# Patient Record
Sex: MALE | Race: WHITE | NOT HISPANIC OR LATINO | Employment: FULL TIME | URBAN - METROPOLITAN AREA
[De-identification: names, ages, dates, MRNs, and addresses within clinical notes are randomized per-mention and may not be internally consistent; named-entity substitution may affect disease eponyms.]

---

## 2018-03-01 ENCOUNTER — LAB (OUTPATIENT)
Dept: LAB | Facility: HOSPITAL | Age: 23
End: 2018-03-01

## 2018-03-01 ENCOUNTER — OFFICE VISIT (OUTPATIENT)
Dept: FAMILY MEDICINE CLINIC | Facility: CLINIC | Age: 23
End: 2018-03-01

## 2018-03-01 VITALS
WEIGHT: 204 LBS | DIASTOLIC BLOOD PRESSURE: 70 MMHG | SYSTOLIC BLOOD PRESSURE: 112 MMHG | HEIGHT: 72 IN | BODY MASS INDEX: 27.63 KG/M2 | HEART RATE: 66 BPM

## 2018-03-01 DIAGNOSIS — Z00.00 ANNUAL PHYSICAL EXAM: ICD-10-CM

## 2018-03-01 DIAGNOSIS — Z00.00 ANNUAL PHYSICAL EXAM: Primary | ICD-10-CM

## 2018-03-01 LAB
ANION GAP SERPL CALCULATED.3IONS-SCNC: 6 MMOL/L (ref 3–11)
ARTICHOKE IGE QN: 76 MG/DL (ref 0–130)
BUN BLD-MCNC: 11 MG/DL (ref 9–23)
BUN/CREAT SERPL: 9.2 (ref 7–25)
CALCIUM SPEC-SCNC: 9.5 MG/DL (ref 8.7–10.4)
CHLORIDE SERPL-SCNC: 103 MMOL/L (ref 99–109)
CHOLEST SERPL-MCNC: 150 MG/DL (ref 0–200)
CO2 SERPL-SCNC: 30 MMOL/L (ref 20–31)
CREAT BLD-MCNC: 1.2 MG/DL (ref 0.6–1.3)
GFR SERPL CREATININE-BSD FRML MDRD: 74 ML/MIN/1.73
GLUCOSE BLD-MCNC: 86 MG/DL (ref 70–100)
HDLC SERPL-MCNC: 56 MG/DL (ref 40–60)
POTASSIUM BLD-SCNC: 4.1 MMOL/L (ref 3.5–5.5)
SODIUM BLD-SCNC: 139 MMOL/L (ref 132–146)
TRIGL SERPL-MCNC: 53 MG/DL (ref 0–150)

## 2018-03-01 PROCEDURE — 36415 COLL VENOUS BLD VENIPUNCTURE: CPT

## 2018-03-01 PROCEDURE — 80061 LIPID PANEL: CPT

## 2018-03-01 PROCEDURE — 80048 BASIC METABOLIC PNL TOTAL CA: CPT

## 2018-03-01 PROCEDURE — 90715 TDAP VACCINE 7 YRS/> IM: CPT | Performed by: INTERNAL MEDICINE

## 2018-03-01 PROCEDURE — 90471 IMMUNIZATION ADMIN: CPT | Performed by: INTERNAL MEDICINE

## 2018-03-01 PROCEDURE — 90686 IIV4 VACC NO PRSV 0.5 ML IM: CPT | Performed by: INTERNAL MEDICINE

## 2018-03-01 PROCEDURE — 99385 PREV VISIT NEW AGE 18-39: CPT | Performed by: INTERNAL MEDICINE

## 2018-03-01 PROCEDURE — 90472 IMMUNIZATION ADMIN EACH ADD: CPT | Performed by: INTERNAL MEDICINE

## 2018-03-01 NOTE — PATIENT INSTRUCTIONS
Preventive Care 18-39 Years, Male  Preventive care refers to lifestyle choices and visits with your health care provider that can promote health and wellness.  What does preventive care include?  · A yearly physical exam. This is also called an annual well check.  · Dental exams once or twice a year.  · Routine eye exams. Ask your health care provider how often you should have your eyes checked.  · Personal lifestyle choices, including:  ¨ Daily care of your teeth and gums.  ¨ Regular physical activity.  ¨ Eating a healthy diet.  ¨ Avoiding tobacco and drug use.  ¨ Limiting alcohol use.  ¨ Practicing safe sex.  What happens during an annual well check?  The services and screenings done by your health care provider during your annual well check will depend on your age, overall health, lifestyle risk factors, and family history of disease.  Counseling   Your health care provider may ask you questions about your:  · Alcohol use.  · Tobacco use.  · Drug use.  · Emotional well-being.  · Home and relationship well-being.  · Sexual activity.  · Eating habits.  · Work and work environment.  Screening   You may have the following tests or measurements:  · Height, weight, and BMI.  · Blood pressure.  · Lipid and cholesterol levels. These may be checked every 5 years starting at age 20.  · Diabetes screening. This is done by checking your blood sugar (glucose) after you have not eaten for a while (fasting).  · Skin check.  · Hepatitis C blood test.  · Hepatitis B blood test.  · Sexually transmitted disease (STD) testing.  Discuss your test results, treatment options, and if necessary, the need for more tests with your health care provider.  Vaccines   Your health care provider may recommend certain vaccines, such as:  · Influenza vaccine. This is recommended every year.  · Tetanus, diphtheria, and acellular pertussis (Tdap, Td) vaccine. You may need a Td booster every 10 years.  · Varicella vaccine. You may need this if you  have not been vaccinated.  · HPV vaccine. If you are 26 or younger, you may need three doses over 6 months.  · Measles, mumps, and rubella (MMR) vaccine. You may need at least one dose of MMR. You may also need a second dose.  · Pneumococcal 13-valent conjugate (PCV13) vaccine. You may need this if you have certain conditions and have not been vaccinated.  · Pneumococcal polysaccharide (PPSV23) vaccine. You may need one or two doses if you smoke cigarettes or if you have certain conditions.  · Meningococcal vaccine. One dose is recommended if you are age 19-21 years and a first-year college student living in a residence [place], or if you have one of several medical conditions. You may also need additional booster doses.  · Hepatitis A vaccine. You may need this if you have certain conditions or if you travel or work in places where you may be exposed to hepatitis A.  · Hepatitis B vaccine. You may need this if you have certain conditions or if you travel or work in places where you may be exposed to hepatitis B.  · Haemophilus influenzae type b (Hib) vaccine. You may need this if you have certain risk factors.  Talk to your health care provider about which screenings and vaccines you need and how often you need them.  This information is not intended to replace advice given to you by your health care provider. Make sure you discuss any questions you have with your health care provider.  Document Released: 02/13/2003 Document Revised: 09/06/2017 Document Reviewed: 10/18/2016  [employer] Patient Education © 2017 [employer]

## 2018-03-01 NOTE — PROGRESS NOTES
Reason for Visit   This is a 23 yr old patient who presents for a complete physical exam.    Chief Complaint   Patient offers no new complaints.  New to me a pcp.  Has not been to pcp in a few years.    History of Present Illness   Here for CPE.    ý   Review of Systems   General: no fever, chills, weight loss, or fatigue  Eyes: no blurry vision or change in vision  ENT: no change in hearing, difficulty hearing, rhinorrhea or nasal discharge  CV: no cp, sob, palpitations; no PND, orthopnea, le edema; no urrutia   Respiratory: no cough, wheezes, sob  GI: no change in bowel habits, no n/v/d/c, no melena, no blood in stool   : no frequency, no urgency, no dysuria  MS: no joint pains, back pain, or myalgias  Neuro: no headache, dizziness, or weakness; no new parenthesis.  Endocrine: no polyuria, polydipsia or polyphagia; no heat or cold intolerance  Heme: no easy bruising or bleeding   Skin: no rashes or abnormal moles     Remainder of ROS reviewed in detail and found to be negative and noncontributory.         There is no problem list on file for this patient.      Past Surgical History:   Procedure Laterality Date   • KNEE ARTHROSCOPY W/ MENISCAL REPAIR Right        Current Outpatient Prescriptions   Medication Sig Dispense Refill   • Creatine powder Take  by mouth Daily.       No current facility-administered medications for this visit.        No Known Allergies    Family History   Family History   Problem Relation Age of Onset   • No Known Problems Sister    • No Known Problems Brother    • Dementia Paternal Grandfather    • Colon cancer Paternal Grandfather    • No Known Problems Sister        Social History   Social History     Social History   • Marital status: Single     Occupational History   •       Social History Main Topics   • Smoking status: Never Smoker   • Smokeless tobacco: Never Used   • Alcohol use 3.6 oz/week     6 Cans of beer per week   • Drug use: No   • Sexual  "activity: Yes     Birth control/ protection: Pill     Social History Narrative    3/18:    Working FT - pharm rep    Living with 3 roommates.    [employer] grad - [employer]    Current sig other- 1yr, going well.    +SA - girlfriend on ocp    Exercise - 6/7 days/wk    Dental: utd    Eye: no corrective lenses              There is no immunization history on file for this patient.    Health Maintenance  Health Maintenance   Topic Date Due   • TDAP/TD VACCINES (1 - Tdap) 02/08/2012   • INFLUENZA VACCINE  08/01/2017       Blood pressure 112/70, pulse 66, height 182.9 cm (72\"), weight 92.5 kg (204 lb).  Body mass index is 27.67 kg/(m^2).  Last 3 weights    03/01/18  0816   Weight: 92.5 kg (204 lb)         Physical Exam   Gen: Pleasant, NAD  HEENT: perrla, eomi, conj without pallor, no icterus/TMs with nl LR/ nares without erythema/mmm, op clear  Neck: supple, no lad or thyromegaly/nodules, no bruit , no jvd  Pulm: cta b/l, no w/r/r  CV: S1, S2 reg, no mrg, no s3/s4  Abd: soft, nt, nd, + bs, no hsm  : Normal male, no masses  Ext: no c/c/e, distal pulses intact  Neuro: AAO x 3, no focal motor or sensory deficits, normal gait   Skin: no rashes, no suspicious nevi     Assessment and Plan  23M for CPE  Discussion and plan:   General health screening appropriate fo rage reviewed  General nutrition recommendations reviewed  Exercise recommendations reviewed  Healthy weight guidelines reviewed  Testicular self exam reviewed, monthly self exam recommended  Annual physical exam recommended.  Imm : flu and tdap given  Screening labs ordered      "

## 2019-03-08 ENCOUNTER — OFFICE VISIT (OUTPATIENT)
Dept: FAMILY MEDICINE CLINIC | Facility: CLINIC | Age: 24
End: 2019-03-08

## 2019-03-08 VITALS
HEIGHT: 72 IN | HEART RATE: 73 BPM | TEMPERATURE: 99.3 F | WEIGHT: 208 LBS | OXYGEN SATURATION: 99 % | BODY MASS INDEX: 28.17 KG/M2

## 2019-03-08 DIAGNOSIS — J06.9 ACUTE URI: Primary | ICD-10-CM

## 2019-03-08 PROCEDURE — 99213 OFFICE O/P EST LOW 20 MIN: CPT | Performed by: NURSE PRACTITIONER

## 2019-03-08 RX ORDER — BROMPHENIRAMINE MALEATE, PSEUDOEPHEDRINE HYDROCHLORIDE, AND DEXTROMETHORPHAN HYDROBROMIDE 2; 30; 10 MG/5ML; MG/5ML; MG/5ML
5 SYRUP ORAL 4 TIMES DAILY PRN
Qty: 120 ML | Refills: 1 | Status: SHIPPED | OUTPATIENT
Start: 2019-03-08

## 2019-03-08 RX ORDER — AMOXICILLIN AND CLAVULANATE POTASSIUM 875; 125 MG/1; MG/1
1 TABLET, FILM COATED ORAL 2 TIMES DAILY
Qty: 14 TABLET | Refills: 0 | Status: SHIPPED | OUTPATIENT
Start: 2019-03-08

## 2019-03-08 NOTE — PROGRESS NOTES
Chief Complaint   Patient presents with   • URI     patient states he is having a head cold since tuesday morning. Has been taking mucines OTC to help and it has helped some but still lingering symptoms       Subjective   Festus High is a 24 y.o. male    History of Present Illness  Patient in with today with upper respiratory infection.  Ongoing since about Tuesday morning.  Patient has had fevers, sinus congestion, sore throat and ear pressure.  He denies body aches.  Patient has been taken Mucinex, ibuprofen.  Nighttime Mucinex seems to help quite a bit as well as the ibuprofen.  Daytime Mucinex does not seem to help this much.  He has had multiple family members as well as a girlfriend with similar symptoms.    No Known Allergies  History reviewed. No pertinent past medical history.   Past Surgical History:   Procedure Laterality Date   • KNEE ARTHROSCOPY W/ MENISCAL REPAIR Right      Social History     Socioeconomic History   • Marital status: Single     Spouse name: Not on file   • Number of children: Not on file   • Years of education: Not on file   • Highest education level: Not on file   Social Needs   • Financial resource strain: Not on file   • Food insecurity - worry: Not on file   • Food insecurity - inability: Not on file   • Transportation needs - medical: Not on file   • Transportation needs - non-medical: Not on file   Occupational History   • Occupation:    Tobacco Use   • Smoking status: Never Smoker   • Smokeless tobacco: Never Used   Substance and Sexual Activity   • Alcohol use: Yes     Alcohol/week: 3.6 oz     Types: 6 Cans of beer per week   • Drug use: No   • Sexual activity: Yes     Birth control/protection: Pill   Other Topics Concern   • Not on file   Social History Narrative    3/18:    Working FT - pharm rep    Living with 3 roommates.    [employer] grad - [employer]    Current sig other- 1yr, going well.    +SA - girlfriend on ocp    Exercise -  6/7 days/wk    Dental: utd    Eye: no corrective lenses        Current Outpatient Medications:   •  amoxicillin-clavulanate (AUGMENTIN) 875-125 MG per tablet, Take 1 tablet by mouth 2 (Two) Times a Day., Disp: 14 tablet, Rfl: 0  •  brompheniramine-pseudoephedrine-DM 30-2-10 MG/5ML syrup, Take 5 mL by mouth 4 (Four) Times a Day As Needed for Cough., Disp: 120 mL, Rfl: 1  •  Creatine powder, Take  by mouth Daily., Disp: , Rfl:      Review of Systems   Constitutional: Positive for chills, fatigue and fever.   HENT: Positive for congestion, postnasal drip, rhinorrhea and sore throat.    Eyes: Positive for discharge and itching.   Respiratory: Negative for cough, chest tightness, shortness of breath and wheezing.    Cardiovascular: Negative for chest pain.   Gastrointestinal: Negative for constipation, diarrhea, nausea and vomiting.   Genitourinary: Negative for difficulty urinating and dysuria.   Musculoskeletal: Negative for myalgias.   Neurological: Positive for headaches.   Psychiatric/Behavioral: Positive for sleep disturbance.       Objective     Vitals:    03/08/19 1247   Pulse: 73   Temp: 99.3 °F (37.4 °C)   SpO2: 99%       Results for orders placed or performed in visit on 03/01/18   Lipid panel   Result Value Ref Range    Total Cholesterol 150 0 - 200 mg/dL    Triglycerides 53 0 - 150 mg/dL    HDL Cholesterol 56 40 - 60 mg/dL    LDL Cholesterol  76 0 - 130 mg/dL   Basic metabolic panel   Result Value Ref Range    Glucose 86 70 - 100 mg/dL    BUN 11 9 - 23 mg/dL    Creatinine 1.20 0.60 - 1.30 mg/dL    Sodium 139 132 - 146 mmol/L    Potassium 4.1 3.5 - 5.5 mmol/L    Chloride 103 99 - 109 mmol/L    CO2 30.0 20.0 - 31.0 mmol/L    Calcium 9.5 8.7 - 10.4 mg/dL    eGFR Non African Amer 74 >60 mL/min/1.73    BUN/Creatinine Ratio 9.2 7.0 - 25.0    Anion Gap 6.0 3.0 - 11.0 mmol/L       Physical Exam   Constitutional: He is oriented to person, place, and time. He appears well-developed and well-nourished.   HENT:   Head:  Normocephalic and atraumatic.   Right Ear: Hearing and external ear normal. Tympanic membrane is scarred. No middle ear effusion.   Left Ear: Hearing and external ear normal.  No middle ear effusion.   Nose: Rhinorrhea present. Right sinus exhibits no maxillary sinus tenderness and no frontal sinus tenderness. Left sinus exhibits no maxillary sinus tenderness and no frontal sinus tenderness.   Mouth/Throat: Posterior oropharyngeal erythema present.   Cardiovascular: Normal rate, regular rhythm and normal heart sounds.   Pulmonary/Chest: Effort normal and breath sounds normal.   Musculoskeletal: He exhibits no edema.   Neurological: He is alert and oriented to person, place, and time.   Skin: Skin is warm and dry.   Psychiatric: He has a normal mood and affect. His behavior is normal.   Vitals reviewed.      Assessment/Plan   Problem List Items Addressed This Visit     None      Visit Diagnoses     Acute URI    -  Primary    Relevant Medications    brompheniramine-pseudoephedrine-DM 30-2-10 MG/5ML syrup    amoxicillin-clavulanate (AUGMENTIN) 875-125 MG per tablet      Patient was encouraged to keep me informed of any acute changes, lack of improvement, or any new concerning symptoms.Patient to take medications as directed. Make sure to take all of them. Return if no improvement or worsens in 5-7 days. If concerned after hours, may go to [facility] or ER for evaluation/Treatment.  Patient given contingency antibiotic prescription and instructed to take if not better in 7-10 days, or worsen with fever. Discard if not needed.  Patient will walk-in early next week for PPD skin test and a flu shot    MARSHALL- TERRY Colin, MARYAM   3/8/2019   1:12 PM

## 2019-03-19 ENCOUNTER — CLINICAL SUPPORT (OUTPATIENT)
Dept: FAMILY MEDICINE CLINIC | Facility: CLINIC | Age: 24
End: 2019-03-19

## 2019-03-19 DIAGNOSIS — Z23 FLU VACCINE NEED: Primary | ICD-10-CM

## 2019-03-19 PROCEDURE — 90471 IMMUNIZATION ADMIN: CPT | Performed by: INTERNAL MEDICINE

## 2019-03-19 PROCEDURE — 90686 IIV4 VACC NO PRSV 0.5 ML IM: CPT | Performed by: INTERNAL MEDICINE

## 2019-03-22 ENCOUNTER — CLINICAL SUPPORT (OUTPATIENT)
Dept: FAMILY MEDICINE CLINIC | Facility: CLINIC | Age: 24
End: 2019-03-22